# Patient Record
Sex: MALE | Race: WHITE | NOT HISPANIC OR LATINO | Employment: FULL TIME | URBAN - METROPOLITAN AREA
[De-identification: names, ages, dates, MRNs, and addresses within clinical notes are randomized per-mention and may not be internally consistent; named-entity substitution may affect disease eponyms.]

---

## 2024-11-15 ENCOUNTER — APPOINTMENT (OUTPATIENT)
Dept: MRI IMAGING | Facility: HOSPITAL | Age: 34
End: 2024-11-15
Payer: COMMERCIAL

## 2024-11-15 ENCOUNTER — HOSPITAL ENCOUNTER (EMERGENCY)
Facility: HOSPITAL | Age: 34
Discharge: HOME | End: 2024-11-16
Attending: EMERGENCY MEDICINE
Payer: COMMERCIAL

## 2024-11-15 DIAGNOSIS — H53.9 CHANGES IN VISION: Primary | ICD-10-CM

## 2024-11-15 LAB
ALBUMIN SERPL-MCNC: 4 G/DL (ref 3.5–5.2)
ALBUMIN/GLOB SERPL: 1.3 G/DL
ALP SERPL-CCNC: 109 U/L (ref 39–117)
ALT SERPL W P-5'-P-CCNC: 13 U/L (ref 1–41)
ANION GAP SERPL CALCULATED.3IONS-SCNC: 9.1 MMOL/L (ref 5–15)
AST SERPL-CCNC: 14 U/L (ref 1–40)
BASOPHILS # BLD AUTO: 0.18 10*3/MM3 (ref 0–0.2)
BASOPHILS NFR BLD AUTO: 1.1 % (ref 0–1.5)
BILIRUB SERPL-MCNC: 0.3 MG/DL (ref 0–1.2)
BUN SERPL-MCNC: 12 MG/DL (ref 6–20)
BUN/CREAT SERPL: 14.6 (ref 7–25)
CALCIUM SPEC-SCNC: 9.3 MG/DL (ref 8.6–10.5)
CHLORIDE SERPL-SCNC: 104 MMOL/L (ref 98–107)
CO2 SERPL-SCNC: 25.9 MMOL/L (ref 22–29)
CREAT SERPL-MCNC: 0.82 MG/DL (ref 0.76–1.27)
CRP SERPL-MCNC: 0.96 MG/DL (ref 0–0.5)
DEPRECATED RDW RBC AUTO: 43.7 FL (ref 37–54)
EGFRCR SERPLBLD CKD-EPI 2021: 117.5 ML/MIN/1.73
EOSINOPHIL # BLD AUTO: 0.47 10*3/MM3 (ref 0–0.4)
EOSINOPHIL NFR BLD AUTO: 3 % (ref 0.3–6.2)
ERYTHROCYTE [DISTWIDTH] IN BLOOD BY AUTOMATED COUNT: 13.4 % (ref 12.3–15.4)
ERYTHROCYTE [SEDIMENTATION RATE] IN BLOOD: 10 MM/HR (ref 0–15)
GLOBULIN UR ELPH-MCNC: 3.2 GM/DL
GLUCOSE SERPL-MCNC: 89 MG/DL (ref 65–99)
HCT VFR BLD AUTO: 51.4 % (ref 37.5–51)
HGB BLD-MCNC: 17.2 G/DL (ref 13–17.7)
IMM GRANULOCYTES # BLD AUTO: 0.13 10*3/MM3 (ref 0–0.05)
IMM GRANULOCYTES NFR BLD AUTO: 0.8 % (ref 0–0.5)
LYMPHOCYTES # BLD AUTO: 4.94 10*3/MM3 (ref 0.7–3.1)
LYMPHOCYTES NFR BLD AUTO: 31.1 % (ref 19.6–45.3)
MAGNESIUM SERPL-MCNC: 2 MG/DL (ref 1.6–2.6)
MCH RBC QN AUTO: 29.7 PG (ref 26.6–33)
MCHC RBC AUTO-ENTMCNC: 33.5 G/DL (ref 31.5–35.7)
MCV RBC AUTO: 88.8 FL (ref 79–97)
MONOCYTES # BLD AUTO: 1.18 10*3/MM3 (ref 0.1–0.9)
MONOCYTES NFR BLD AUTO: 7.4 % (ref 5–12)
NEUTROPHILS NFR BLD AUTO: 56.6 % (ref 42.7–76)
NEUTROPHILS NFR BLD AUTO: 8.96 10*3/MM3 (ref 1.7–7)
NRBC BLD AUTO-RTO: 0 /100 WBC (ref 0–0.2)
PLATELET # BLD AUTO: 310 10*3/MM3 (ref 140–450)
PMV BLD AUTO: 10.2 FL (ref 6–12)
POTASSIUM SERPL-SCNC: 3.8 MMOL/L (ref 3.5–5.2)
PROT SERPL-MCNC: 7.2 G/DL (ref 6–8.5)
RBC # BLD AUTO: 5.79 10*6/MM3 (ref 4.14–5.8)
SODIUM SERPL-SCNC: 139 MMOL/L (ref 136–145)
WBC NRBC COR # BLD AUTO: 15.86 10*3/MM3 (ref 3.4–10.8)

## 2024-11-15 PROCEDURE — 80053 COMPREHEN METABOLIC PANEL: CPT

## 2024-11-15 PROCEDURE — 70553 MRI BRAIN STEM W/O & W/DYE: CPT

## 2024-11-15 PROCEDURE — A9577 INJ MULTIHANCE: HCPCS | Performed by: EMERGENCY MEDICINE

## 2024-11-15 PROCEDURE — 85652 RBC SED RATE AUTOMATED: CPT

## 2024-11-15 PROCEDURE — 83735 ASSAY OF MAGNESIUM: CPT

## 2024-11-15 PROCEDURE — 86140 C-REACTIVE PROTEIN: CPT

## 2024-11-15 PROCEDURE — 36415 COLL VENOUS BLD VENIPUNCTURE: CPT

## 2024-11-15 PROCEDURE — 85025 COMPLETE CBC W/AUTO DIFF WBC: CPT

## 2024-11-15 PROCEDURE — 25510000002 GADOBENATE DIMEGLUMINE 529 MG/ML SOLUTION: Performed by: EMERGENCY MEDICINE

## 2024-11-15 PROCEDURE — 99285 EMERGENCY DEPT VISIT HI MDM: CPT

## 2024-11-15 RX ADMIN — GADOBENATE DIMEGLUMINE 19 ML: 529 INJECTION, SOLUTION INTRAVENOUS at 22:02

## 2024-11-16 VITALS
BODY MASS INDEX: 29.32 KG/M2 | TEMPERATURE: 98 F | OXYGEN SATURATION: 94 % | RESPIRATION RATE: 18 BRPM | DIASTOLIC BLOOD PRESSURE: 90 MMHG | WEIGHT: 197.97 LBS | HEIGHT: 69 IN | SYSTOLIC BLOOD PRESSURE: 134 MMHG | HEART RATE: 71 BPM

## 2024-11-16 NOTE — DISCHARGE INSTRUCTIONS
Thank you for allowing us to provide care for you today.  Your workup today included MRI imaging and blood workup.  Your workup today revealed no acute findings including MRI imaging.  Recommend that you follow-up with your  and optometrist team in the coming days for ongoing management.  In the event you develop new onset of left eye right eye visual changes including blurriness or loss of vision please return return to the ED immediately for further workup.  Thank you

## 2024-11-16 NOTE — ED PROVIDER NOTES
SHARED VISIT NOTE:    Patient is 34 y.o. year old male that presents to the ED for evaluation of intermittent left eye visual changes.     Physical Exam  Vitals and nursing note reviewed.   Constitutional:       General: He is not in acute distress.     Appearance: Normal appearance. He is not ill-appearing, toxic-appearing or diaphoretic.   HENT:      Head: Normocephalic and atraumatic.      Mouth/Throat:      Mouth: Mucous membranes are moist.   Eyes:      Extraocular Movements: Extraocular movements intact.      Right eye: Normal extraocular motion and no nystagmus.      Left eye: Normal extraocular motion and no nystagmus.      Pupils: Pupils are equal, round, and reactive to light.      Funduscopic exam:     Right eye: No hemorrhage.         Left eye: No hemorrhage.   Neck:      Vascular: No carotid bruit.   Cardiovascular:      Rate and Rhythm: Normal rate and regular rhythm.      Pulses: Normal pulses.           Carotid pulses are 2+ on the right side and 2+ on the left side.       Radial pulses are 2+ on the right side and 2+ on the left side.        Femoral pulses are 2+ on the right side and 2+ on the left side.       Popliteal pulses are 2+ on the right side and 2+ on the left side.        Dorsalis pedis pulses are 2+ on the right side and 2+ on the left side.        Posterior tibial pulses are 2+ on the right side and 2+ on the left side.      Heart sounds: Normal heart sounds. No murmur heard.  Pulmonary:      Effort: Pulmonary effort is normal. No accessory muscle usage, respiratory distress or retractions.      Breath sounds: Normal breath sounds. No wheezing, rhonchi or rales.   Abdominal:      General: Abdomen is flat. There is no distension.      Palpations: Abdomen is soft. There is no mass or pulsatile mass.      Tenderness: There is no abdominal tenderness. There is no right CVA tenderness, left CVA tenderness, guarding or rebound.      Comments: No rigidity   Musculoskeletal:         General:  "No swelling, tenderness or deformity.      Cervical back: Neck supple. No tenderness.      Right lower leg: No edema.      Left lower leg: No edema.   Skin:     General: Skin is warm and dry.      Capillary Refill: Capillary refill takes less than 2 seconds.      Coloration: Skin is not jaundiced or pale.      Findings: No erythema.   Neurological:      General: No focal deficit present.      Mental Status: He is alert and oriented to person, place, and time. Mental status is at baseline.      Cranial Nerves: Cranial nerves 2-12 are intact. No cranial nerve deficit.      Sensory: Sensation is intact. No sensory deficit.      Motor: Motor function is intact. No weakness or pronator drift.      Coordination: Coordination is intact. Coordination normal.   Psychiatric:         Mood and Affect: Mood normal.         Behavior: Behavior normal.         ED Course:    /73   Pulse 68   Temp 98.3 °F (36.8 °C) (Oral)   Resp 18   Ht 175.3 cm (69\")   Wt 89.8 kg (197 lb 15.6 oz)   SpO2 94%   BMI 29.24 kg/m²   Results for orders placed or performed during the hospital encounter of 11/15/24   Comprehensive Metabolic Panel    Collection Time: 11/15/24 10:54 PM    Specimen: Blood   Result Value Ref Range    Glucose 89 65 - 99 mg/dL    BUN 12 6 - 20 mg/dL    Creatinine 0.82 0.76 - 1.27 mg/dL    Sodium 139 136 - 145 mmol/L    Potassium 3.8 3.5 - 5.2 mmol/L    Chloride 104 98 - 107 mmol/L    CO2 25.9 22.0 - 29.0 mmol/L    Calcium 9.3 8.6 - 10.5 mg/dL    Total Protein 7.2 6.0 - 8.5 g/dL    Albumin 4.0 3.5 - 5.2 g/dL    ALT (SGPT) 13 1 - 41 U/L    AST (SGOT) 14 1 - 40 U/L    Alkaline Phosphatase 109 39 - 117 U/L    Total Bilirubin 0.3 0.0 - 1.2 mg/dL    Globulin 3.2 gm/dL    A/G Ratio 1.3 g/dL    BUN/Creatinine Ratio 14.6 7.0 - 25.0    Anion Gap 9.1 5.0 - 15.0 mmol/L    eGFR 117.5 >60.0 mL/min/1.73   C-reactive Protein    Collection Time: 11/15/24 10:54 PM    Specimen: Blood   Result Value Ref Range    C-Reactive Protein 0.96 " (H) 0.00 - 0.50 mg/dL   Sedimentation Rate    Collection Time: 11/15/24 10:54 PM    Specimen: Blood   Result Value Ref Range    Sed Rate 10 0 - 15 mm/hr   CBC Auto Differential    Collection Time: 11/15/24 10:54 PM    Specimen: Blood   Result Value Ref Range    WBC 15.86 (H) 3.40 - 10.80 10*3/mm3    RBC 5.79 4.14 - 5.80 10*6/mm3    Hemoglobin 17.2 13.0 - 17.7 g/dL    Hematocrit 51.4 (H) 37.5 - 51.0 %    MCV 88.8 79.0 - 97.0 fL    MCH 29.7 26.6 - 33.0 pg    MCHC 33.5 31.5 - 35.7 g/dL    RDW 13.4 12.3 - 15.4 %    RDW-SD 43.7 37.0 - 54.0 fl    MPV 10.2 6.0 - 12.0 fL    Platelets 310 140 - 450 10*3/mm3    Neutrophil % 56.6 42.7 - 76.0 %    Lymphocyte % 31.1 19.6 - 45.3 %    Monocyte % 7.4 5.0 - 12.0 %    Eosinophil % 3.0 0.3 - 6.2 %    Basophil % 1.1 0.0 - 1.5 %    Immature Grans % 0.8 (H) 0.0 - 0.5 %    Neutrophils, Absolute 8.96 (H) 1.70 - 7.00 10*3/mm3    Lymphocytes, Absolute 4.94 (H) 0.70 - 3.10 10*3/mm3    Monocytes, Absolute 1.18 (H) 0.10 - 0.90 10*3/mm3    Eosinophils, Absolute 0.47 (H) 0.00 - 0.40 10*3/mm3    Basophils, Absolute 0.18 0.00 - 0.20 10*3/mm3    Immature Grans, Absolute 0.13 (H) 0.00 - 0.05 10*3/mm3    nRBC 0.0 0.0 - 0.2 /100 WBC   Magnesium    Collection Time: 11/15/24 10:54 PM    Specimen: Blood   Result Value Ref Range    Magnesium 2.0 1.6 - 2.6 mg/dL     Medications   gadobenate dimeglumine (MULTIHANCE) injection 19 mL (19 mL Intravenous Given 11/15/24 2202)     MRI Brain With & Without Contrast    Addendum Date: 11/15/2024 Addendum:   ADDENDUM #1 Mild mucosal thickening noted within the ethmoid and frontal sinuses. Please correlate clinically to exclude acute sinusitis. Electronically Signed: Davy Jeffrey DO  11/15/2024 10:23 PM EST  Workstation ID: [id number] ORIGINAL REPORT: MRI BRAIN W WO CONTRAST Date of Exam: 11/15/2024 9:10 PM EST Indication: Recd by optometrist for urgent imaging for r/o demyelinating lesions/optic nerve inflammation. Renate Black OD.  Comparison: None available.  Technique:  Routine multiplanar/multisequence sequence images of the brain were obtained before and after the uneventful administration of Multihance. Findings: No acute infarct or abnormal restricted diffusion. There is no evidence of hemorrhage. No mass effect, edema or midline shift. No abnormal intracranial enhancement. Unremarkable white matter No extra-axial fluid collection. The ventricles are normal in size and configuration. Partially empty sella. Otherwise the midline structures are unremarkable. The visualized vasculature are grossly unremarkable. Dedicated pre and postcontrast orbital images were obtained. No abnormality is identified within the orbits. The globes are unremarkable. No abnormal enhancement or signal abnormality is noted within the optic nerves. The visualized paranasal sinuses and mastoid air cells are clear. The visualized soft tissues are unremarkable. No acute osseous abnormality. Impression: Unremarkable MRI of the brain and orbits. No evidence of an intracranial or optic nerve demyelinating lesion or abnormal enhancement is identified on this study. Electronically Signed: Davy Jeffrey DO  11/15/2024 10:17 PM EST  Workstation ID: [id number]    Result Date: 11/15/2024  Narrative: MRI BRAIN W WO CONTRAST Date of Exam: 11/15/2024 9:10 PM EST Indication: Recd by optometrist for urgent imaging for r/o demyelinating lesions/optic nerve inflammation. Renate Black OD.  Comparison: None available. Technique:  Routine multiplanar/multisequence sequence images of the brain were obtained before and after the uneventful administration of Multihance. Findings: No acute infarct or abnormal restricted diffusion. There is no evidence of hemorrhage. No mass effect, edema or midline shift. No abnormal intracranial enhancement. Unremarkable white matter No extra-axial fluid collection. The ventricles are normal in size and configuration. Partially empty sella. Otherwise the midline structures are  unremarkable. The visualized vasculature are grossly unremarkable. Dedicated pre and postcontrast orbital images were obtained. No abnormality is identified within the orbits. The globes are unremarkable. No abnormal enhancement or signal abnormality is noted within the optic nerves. The visualized paranasal sinuses and mastoid air cells are clear. The visualized soft tissues are unremarkable. No acute osseous abnormality.     Impression: Impression: Unremarkable MRI of the brain and orbits. No evidence of an intracranial or optic nerve demyelinating lesion or abnormal enhancement is identified on this study. Electronically Signed: Davy Jeffrey DO  11/15/2024 10:17 PM EST  Workstation ID: [id number]     MDM:    Procedures        SHARED VISIT ATTESTATION:    This visit was performed by both myself and an APC.  I performed the substantive portion of the medical decision making. The management plan was made or approved by me, and I take responsibility for patient management.           Marciano Alatorre DO  23:48 EST  11/15/24         Marciano Alatorre DO  11/16/24 0138

## 2024-11-16 NOTE — ED PROVIDER NOTES
Time: 8:14 PM EST  Date of encounter:  11/15/2024  Independent Historian/Clinical History and Information was obtained by:   Patient    History is limited by: N/A    Chief Complaint: Blurry vision, loss of vision, eye pain intermittently x 1 month      History of Present Illness:  Patient is a 34 y.o. year old male who presents to the emergency department for evaluation of blurry vision, loss of vision, eye pain of left eye intermittently x 1 month.  Patient reports no known prior medical history.  Patient reports with wife that he has been going to see the optometrist for the last couple of weeks for further workup of his intermittent visual field changes.  Patient and wife report that they were sent today to the ED for stat MRI imaging of the brain and orbits with and without contrast to rule out a demyelinating patient and/or optic neuritis.  Patient denies any recent drug or alcohol use.  Patient reports no recent fever chills nausea or vomiting.  Patient denies any known traumas to the head or to the eyes.  Patient denies any foreign body sensation in eyes.      Patient Care Team  Primary Care Provider: Provider, No Known    Past Medical History:     No Known Allergies  History reviewed. No pertinent past medical history.  History reviewed. No pertinent surgical history.  History reviewed. No pertinent family history.    Home Medications:  Prior to Admission medications    Not on File        Social History:          Review of Systems:  Review of Systems   Constitutional:  Negative for activity change, appetite change and fever.   HENT:  Negative for congestion and sore throat.    Eyes:  Positive for pain and visual disturbance.   Respiratory:  Negative for cough and shortness of breath.    Cardiovascular:  Negative for chest pain and leg swelling.   Gastrointestinal:  Negative for abdominal pain, diarrhea and vomiting.   Endocrine: Negative.    Genitourinary:  Negative for decreased urine volume and dysuria.  "  Musculoskeletal:  Negative for neck pain.   Skin:  Negative for rash and wound.   Allergic/Immunologic: Negative.    Neurological:  Negative for weakness, numbness and headaches.   Hematological: Negative.    Psychiatric/Behavioral: Negative.     All other systems reviewed and are negative.       Physical Exam:  /90 (BP Location: Right arm, Patient Position: Sitting)   Pulse 71   Temp 98 °F (36.7 °C) (Oral)   Resp 18   Ht 175.3 cm (69\")   Wt 89.8 kg (197 lb 15.6 oz)   SpO2 94%   BMI 29.24 kg/m²     Physical Exam  Vitals and nursing note reviewed.   Constitutional:       General: He is not in acute distress.     Appearance: Normal appearance. He is not toxic-appearing.   HENT:      Head: Normocephalic and atraumatic.      Jaw: There is normal jaw occlusion.   Eyes:      General: Lids are normal.         Right eye: No discharge.         Left eye: No discharge.      Extraocular Movements: Extraocular movements intact.      Conjunctiva/sclera: Conjunctivae normal.      Pupils: Pupils are equal, round, and reactive to light.      Comments: No evidence of hyphemas.  No evidence of globe rupture.  EOMs intact.   Cardiovascular:      Rate and Rhythm: Normal rate and regular rhythm.      Pulses: Normal pulses.      Heart sounds: Normal heart sounds.   Pulmonary:      Effort: Pulmonary effort is normal. No respiratory distress.      Breath sounds: Normal breath sounds. No wheezing or rhonchi.   Abdominal:      General: Abdomen is flat.      Palpations: Abdomen is soft.      Tenderness: There is no abdominal tenderness. There is no guarding or rebound.   Musculoskeletal:         General: Normal range of motion.      Cervical back: Normal range of motion and neck supple.      Right lower leg: No edema.      Left lower leg: No edema.   Skin:     General: Skin is warm and dry.   Neurological:      Mental Status: He is alert and oriented to person, place, and time. Mental status is at baseline.   Psychiatric:        "  Mood and Affect: Mood normal.                  Procedures:  Procedures      Medical Decision Making:      Comorbidities that affect care:    None    External Notes reviewed:    Previous Clinic Note: Previous ED visit on 10/14/2024 reviewed.      The following orders were placed and all results were independently analyzed by me:  Orders Placed This Encounter   Procedures    MRI Brain Without Contrast    MRI Brain With & Without Contrast    Comprehensive Metabolic Panel    C-reactive Protein    Sedimentation Rate    CBC Auto Differential    Magnesium    Ambulatory Referral to Ophthalmology    Please complete snellen eye exam for visual acuity.  Misc Nursing Order (Specify)    CBC & Differential       Medications Given in the Emergency Department:  Medications   gadobenate dimeglumine (MULTIHANCE) injection 19 mL (19 mL Intravenous Given 11/15/24 2202)        ED Course:    ED Course as of 11/16/24 0016   Fri Nov 15, 2024   2051 Reviewed discharge paperwork provided by [facility] and Dr. Renate Black [facility].  Patient was sent via personal vehicle for MRI brain and orbits with and without contrast.  Orders placed this time.  [CB]   2259 Visual acuity 20/10 bilaterally. [CB]   2326 Blood work reveals evidence of leukocytosis 15.86 and a C-reactive protein of 0.96. [CB]   2352 Discussed with patient at bedside unrevealing findings based on MRI imaging and blood work.  Discussed with patient follow-up appointment with previously established optometrist and ophthalmology group along with future follow-up with ED in the event that new onset vision changes or eye pain returns.  Patient voices understanding of return precautions along with agreement to current plan. [CB]      ED Course User Index  [CB] Handy Castillo, PATessaC       Labs:    Lab Results (last 24 hours)       Procedure Component Value Units Date/Time    CBC & Differential [[id number]]  (Abnormal) Collected: 11/15/24 2254    Specimen:  Blood Updated: 11/15/24 2304    Narrative:      The following orders were created for panel order CBC & Differential.  Procedure                               Abnormality         Status                     ---------                               -----------         ------                     CBC Auto Differential[[id number]]        Abnormal            Final result                 Please view results for these tests on the individual orders.    Comprehensive Metabolic Panel [[id number]] Collected: 11/15/24 2254    Specimen: Blood Updated: 11/15/24 2321     Glucose 89 mg/dL      BUN 12 mg/dL      Creatinine 0.82 mg/dL      Sodium 139 mmol/L      Potassium 3.8 mmol/L      Chloride 104 mmol/L      CO2 25.9 mmol/L      Calcium 9.3 mg/dL      Total Protein 7.2 g/dL      Albumin 4.0 g/dL      ALT (SGPT) 13 U/L      AST (SGOT) 14 U/L      Alkaline Phosphatase 109 U/L      Total Bilirubin 0.3 mg/dL      Globulin 3.2 gm/dL      A/G Ratio 1.3 g/dL      BUN/Creatinine Ratio 14.6     Anion Gap 9.1 mmol/L      eGFR 117.5 mL/min/1.73     Narrative:      GFR Normal >60  Chronic Kidney Disease <60  Kidney Failure <15      C-reactive Protein [[id number]]  (Abnormal) Collected: 11/15/24 2254    Specimen: Blood Updated: 11/15/24 2321     C-Reactive Protein 0.96 mg/dL     Sedimentation Rate [[id number]]  (Normal) Collected: 11/15/24 2254    Specimen: Blood Updated: 11/15/24 2307     Sed Rate 10 mm/hr     CBC Auto Differential [[id number]]  (Abnormal) Collected: 11/15/24 2254    Specimen: Blood Updated: 11/15/24 2304     WBC 15.86 10*3/mm3      RBC 5.79 10*6/mm3      Hemoglobin 17.2 g/dL      Hematocrit 51.4 %      MCV 88.8 fL      MCH 29.7 pg      MCHC 33.5 g/dL      RDW 13.4 %      RDW-SD 43.7 fl      MPV 10.2 fL      Platelets 310 10*3/mm3      Neutrophil % 56.6 %      Lymphocyte % 31.1 %      Monocyte % 7.4 %      Eosinophil % 3.0 %      Basophil % 1.1 %      Immature Grans % 0.8 %      Neutrophils, Absolute 8.96 10*3/mm3       Lymphocytes, Absolute 4.94 10*3/mm3      Monocytes, Absolute 1.18 10*3/mm3      Eosinophils, Absolute 0.47 10*3/mm3      Basophils, Absolute 0.18 10*3/mm3      Immature Grans, Absolute 0.13 10*3/mm3      nRBC 0.0 /100 WBC     Magnesium [[id number]]  (Normal) Collected: 11/15/24 2254    Specimen: Blood Updated: 11/15/24 2321     Magnesium 2.0 mg/dL              Imaging:    MRI Brain With & Without Contrast    Addendum Date: 11/15/2024    ADDENDUM #1 Mild mucosal thickening noted within the ethmoid and frontal sinuses. Please correlate clinically to exclude acute sinusitis. Electronically Signed: Davy Jeffrey DO  11/15/2024 10:23 PM EST  Workstation ID: [id number] ORIGINAL REPORT: MRI BRAIN W WO CONTRAST Date of Exam: 11/15/2024 9:10 PM EST Indication: Recd by optometrist for urgent imaging for r/o demyelinating lesions/optic nerve inflammation. Renate Black OD.  Comparison: None available. Technique:  Routine multiplanar/multisequence sequence images of the brain were obtained before and after the uneventful administration of Multihance. Findings: No acute infarct or abnormal restricted diffusion. There is no evidence of hemorrhage. No mass effect, edema or midline shift. No abnormal intracranial enhancement. Unremarkable white matter No extra-axial fluid collection. The ventricles are normal in size and configuration. Partially empty sella. Otherwise the midline structures are unremarkable. The visualized vasculature are grossly unremarkable. Dedicated pre and postcontrast orbital images were obtained. No abnormality is identified within the orbits. The globes are unremarkable. No abnormal enhancement or signal abnormality is noted within the optic nerves. The visualized paranasal sinuses and mastoid air cells are clear. The visualized soft tissues are unremarkable. No acute osseous abnormality. Impression: Unremarkable MRI of the brain and orbits. No evidence of an intracranial or optic nerve demyelinating  lesion or abnormal enhancement is identified on this study. Electronically Signed: Davy Jeffrey DO  11/15/2024 10:17 PM EST  Workstation ID: [id number]    Result Date: 11/15/2024  MRI BRAIN W WO CONTRAST Date of Exam: 11/15/2024 9:10 PM EST Indication: Recd by optometrist for urgent imaging for r/o demyelinating lesions/optic nerve inflammation. Renate Black OD.  Comparison: None available. Technique:  Routine multiplanar/multisequence sequence images of the brain were obtained before and after the uneventful administration of Multihance. Findings: No acute infarct or abnormal restricted diffusion. There is no evidence of hemorrhage. No mass effect, edema or midline shift. No abnormal intracranial enhancement. Unremarkable white matter No extra-axial fluid collection. The ventricles are normal in size and configuration. Partially empty sella. Otherwise the midline structures are unremarkable. The visualized vasculature are grossly unremarkable. Dedicated pre and postcontrast orbital images were obtained. No abnormality is identified within the orbits. The globes are unremarkable. No abnormal enhancement or signal abnormality is noted within the optic nerves. The visualized paranasal sinuses and mastoid air cells are clear. The visualized soft tissues are unremarkable. No acute osseous abnormality.     Impression: Unremarkable MRI of the brain and orbits. No evidence of an intracranial or optic nerve demyelinating lesion or abnormal enhancement is identified on this study. Electronically Signed: Davy Jeffrey,   11/15/2024 10:17 PM EST  Workstation ID: [id number]       Differential Diagnosis and Discussion:    Eye Pain/Blurred Vision: Differential diagnosis includes but is not limited to dacryocystitis, hordeolum, chalazion, periorbital cellulitis, cavernous sinus thrombosis, blepharitis, and glaucoma.    All labs were reviewed and interpreted by me.    [facility]  MRI of head and orbits with and without contrast  was completed today as requested by ophthalmologist referral.  MRI imaging revealed no acute findings today including ongoing evidence of demyelination or optic neuritis.  Physical exam findings today revealed no acute findings in the patient's ocular examination as mentioned in the physical exam.  Blood work was ordered today for suspicion of hemodynamic or metabolic abnormalities.  CBC revealed leukocytosis of 15 without acute evidence of physical exam findings or changes congruent with location of inflammation or infection.  CRP was mildly elevated 0.96 without acute findings.  Meningeal signs were absent at bedside.  Decision for outpatient discharge with follow-up with ophthalmology for ongoing management of intermittent visual changes and ongoing management.                  Patient Care Considerations:    ANTIBIOTICS: I considered prescribing antibiotics as an outpatient however no bacterial focus of infection was found.      Consultants/Shared Management Plan:    SHARED VISIT: I have discussed the case with my supervising physician, Dr. Alatorre who states agrees with treatment plan. The substantive portion of the medical decision was made by the attesting physician who made or approve the management plan and will take responsibility for the patient.  Clinical findings were discussed and ultimate disposition was made in consult with supervising physician.    Social Determinants of Health:    Patient has presented with family members who are responsible, reliable and will ensure follow up care.      Disposition and Care Coordination:    Discharged: The patient is suitable and stable for discharge with no need for consideration of admission.    I have explained the patient´s condition, diagnoses and treatment plan based on the information available to me at this time. I have answered questions and addressed any concerns. The patient has a good  understanding of the patient´s diagnosis, condition, and treatment  plan as can be expected at this point. The vital signs have been stable. The patient´s condition is stable and appropriate for discharge from the emergency department.      The patient will pursue further outpatient evaluation with the primary care physician or other designated or consulting physician as outlined in the discharge instructions. They are agreeable to this plan of care and follow-up instructions have been explained in detail. The patient has received these instructions in written format and has expressed an understanding of the discharge instructions. The patient is aware that any significant change in condition or worsening of symptoms should prompt an immediate return to this or the closest emergency department or call to 911.  I have explained discharge medications and the need for follow up with the patient/caretakers. This was also printed in the discharge instructions. Patient was discharged with the following medications and follow up:      Medication List      No changes were made to your prescriptions during this visit.      Provider, No Known  [facility]  [place] [address]    Schedule an appointment as soon as possible for a visit       Jack Frank MD  [address]  [facility] [id number] [phone]             Final diagnoses:   Changes in vision        ED Disposition       ED Disposition   Discharge    Condition   Stable    Comment   --               This medical record created using voice recognition software.             Handy Castillo PA-C  11/16/24 0017